# Patient Record
Sex: FEMALE | HISPANIC OR LATINO | URBAN - METROPOLITAN AREA
[De-identification: names, ages, dates, MRNs, and addresses within clinical notes are randomized per-mention and may not be internally consistent; named-entity substitution may affect disease eponyms.]

---

## 2023-02-16 ENCOUNTER — ROUTINE PRENATAL (OUTPATIENT)
Facility: HOSPITAL | Age: 28
End: 2023-02-16
Attending: STUDENT IN AN ORGANIZED HEALTH CARE EDUCATION/TRAINING PROGRAM

## 2023-02-16 VITALS
HEART RATE: 101 BPM | BODY MASS INDEX: 27.53 KG/M2 | HEIGHT: 61 IN | DIASTOLIC BLOOD PRESSURE: 64 MMHG | SYSTOLIC BLOOD PRESSURE: 102 MMHG | WEIGHT: 145.8 LBS

## 2023-02-16 DIAGNOSIS — Z36.86 ENCOUNTER FOR ANTENATAL SCREENING FOR CERVICAL LENGTH: ICD-10-CM

## 2023-02-16 DIAGNOSIS — O09.899 SUPERVISION OF OTHER HIGH RISK PREGNANCIES, UNSPECIFIED TRIMESTER: ICD-10-CM

## 2023-02-16 DIAGNOSIS — Z36.3 ENCOUNTER FOR ANTENATAL SCREENING FOR MALFORMATION: Primary | ICD-10-CM

## 2023-02-16 DIAGNOSIS — Z3A.21 21 WEEKS GESTATION OF PREGNANCY: ICD-10-CM

## 2023-02-16 DIAGNOSIS — O28.3 ECHOGENIC INTRACARDIAC FOCUS OF FETUS ON PRENATAL ULTRASOUND: ICD-10-CM

## 2023-02-16 RX ORDER — OMEGA-3/DHA/EPA/FISH OIL 60 MG-90MG
CAPSULE ORAL
COMMUNITY

## 2023-02-16 NOTE — LETTER
February 16, 2023     Annalise Mejia [address]    Patient: Lisa Wilson   YOB: 1995   Date of Visit: 2/16/2023       Dear Dr Uriel Mcfadden: Thank you for referring Lisa Wilson to me for evaluation  Below are my notes for this consultation  If you have questions, please do not hesitate to call me  I look forward to following your patient along with you  Sincerely,        Garo Solomon MD        CC: No Recipients  Garo Solomon MD  2/16/2023  9:50 AM  Sign when Signing Visit  [address]: Ms Elsa Gibbons was seen today for anatomic survey and cervical length screening ultrasound  See ultrasound report under "OB Procedures" tab  Review of Systems   Constitutional: Negative for chills, fever and unexpected weight change  HENT: Negative for congestion, dental problem, facial swelling and sore throat  Eyes: Negative for visual disturbance  Respiratory: Negative for cough and shortness of breath  Cardiovascular: Negative for chest pain and palpitations  Gastrointestinal: Negative for diarrhea and vomiting  Endocrine: Negative for polydipsia  Genitourinary: Negative for dysuria and vaginal bleeding  Musculoskeletal: Negative for back pain and joint swelling  Skin: Negative for rash and wound  Allergic/Immunologic: Negative for immunocompromised state  Neurological: Positive for headaches  Negative for seizures  Hematological: Does not bruise/bleed easily  Psychiatric/Behavioral: Negative for dysphoric mood, hallucinations and suicidal ideas  Physical Exam  Constitutional:       General: She is not in acute distress  Appearance: Normal appearance  HENT:      Head: Normocephalic and atraumatic  Eyes:      Extraocular Movements: Extraocular movements intact  Cardiovascular:      Rate and Rhythm: Normal rate     Pulmonary:      Effort: Pulmonary effort is normal  No respiratory distress  Skin:     Findings: No erythema or rash  Neurological:      Mental Status: She is alert and oriented to person, place, and time  Psychiatric:         Mood and Affect: Mood normal          Behavior: Behavior normal          Please don't hesitate to contact our office with any concerns or questions    -Jayne Ramirez MD

## 2023-02-16 NOTE — PROGRESS NOTES
[address]: Ms Berny Mauricio was seen today for anatomic survey and cervical length screening ultrasound  See ultrasound report under "OB Procedures" tab  Review of Systems   Constitutional: Negative for chills, fever and unexpected weight change  HENT: Negative for congestion, dental problem, facial swelling and sore throat  Eyes: Negative for visual disturbance  Respiratory: Negative for cough and shortness of breath  Cardiovascular: Negative for chest pain and palpitations  Gastrointestinal: Negative for diarrhea and vomiting  Endocrine: Negative for polydipsia  Genitourinary: Negative for dysuria and vaginal bleeding  Musculoskeletal: Negative for back pain and joint swelling  Skin: Negative for rash and wound  Allergic/Immunologic: Negative for immunocompromised state  Neurological: Positive for headaches  Negative for seizures  Hematological: Does not bruise/bleed easily  Psychiatric/Behavioral: Negative for dysphoric mood, hallucinations and suicidal ideas  Physical Exam  Constitutional:       General: She is not in acute distress  Appearance: Normal appearance  HENT:      Head: Normocephalic and atraumatic  Eyes:      Extraocular Movements: Extraocular movements intact  Cardiovascular:      Rate and Rhythm: Normal rate  Pulmonary:      Effort: Pulmonary effort is normal  No respiratory distress  Skin:     Findings: No erythema or rash  Neurological:      Mental Status: She is alert and oriented to person, place, and time  Psychiatric:         Mood and Affect: Mood normal          Behavior: Behavior normal          Please don't hesitate to contact our office with any concerns or questions    -Sherry Farias MD